# Patient Record
Sex: MALE | Race: BLACK OR AFRICAN AMERICAN | NOT HISPANIC OR LATINO | Employment: UNEMPLOYED | URBAN - NONMETROPOLITAN AREA
[De-identification: names, ages, dates, MRNs, and addresses within clinical notes are randomized per-mention and may not be internally consistent; named-entity substitution may affect disease eponyms.]

---

## 2017-02-06 ENCOUNTER — HOSPITAL ENCOUNTER (EMERGENCY)
Facility: HOSPITAL | Age: 22
Discharge: HOME | End: 2017-02-06
Attending: EMERGENCY MEDICINE | Admitting: EMERGENCY MEDICINE

## 2017-02-06 ENCOUNTER — APPOINTMENT (OUTPATIENT)
Dept: GENERAL RADIOLOGY | Facility: HOSPITAL | Age: 22
End: 2017-02-06

## 2017-02-06 VITALS
WEIGHT: 130 LBS | RESPIRATION RATE: 21 BRPM | BODY MASS INDEX: 20.89 KG/M2 | TEMPERATURE: 97.9 F | OXYGEN SATURATION: 99 % | SYSTOLIC BLOOD PRESSURE: 112 MMHG | HEART RATE: 54 BPM | HEIGHT: 66 IN | DIASTOLIC BLOOD PRESSURE: 71 MMHG

## 2017-02-06 DIAGNOSIS — A64 STD (MALE): Primary | ICD-10-CM

## 2017-02-06 DIAGNOSIS — M25.511 ACUTE PAIN OF RIGHT SHOULDER: ICD-10-CM

## 2017-02-06 DIAGNOSIS — S20.211A CONTUSION OF CHEST WALL, RIGHT, INITIAL ENCOUNTER: ICD-10-CM

## 2017-02-06 PROCEDURE — 71010 HC CHEST PA OR AP: CPT

## 2017-02-06 PROCEDURE — 25010000002 CEFTRIAXONE PER 250 MG: Performed by: PHYSICIAN ASSISTANT

## 2017-02-06 PROCEDURE — 73030 X-RAY EXAM OF SHOULDER: CPT

## 2017-02-06 PROCEDURE — 96372 THER/PROPH/DIAG INJ SC/IM: CPT

## 2017-02-06 PROCEDURE — 99283 EMERGENCY DEPT VISIT LOW MDM: CPT

## 2017-02-06 RX ORDER — CEFTRIAXONE SODIUM 250 MG/1
250 INJECTION, POWDER, FOR SOLUTION INTRAMUSCULAR; INTRAVENOUS ONCE
Status: COMPLETED | OUTPATIENT
Start: 2017-02-06 | End: 2017-02-06

## 2017-02-06 RX ORDER — METRONIDAZOLE 500 MG/1
2000 TABLET ORAL ONCE
Status: COMPLETED | OUTPATIENT
Start: 2017-02-06 | End: 2017-02-06

## 2017-02-06 RX ORDER — ONDANSETRON 4 MG/1
4 TABLET, ORALLY DISINTEGRATING ORAL ONCE
Status: COMPLETED | OUTPATIENT
Start: 2017-02-06 | End: 2017-02-06

## 2017-02-06 RX ORDER — AZITHROMYCIN 250 MG/1
1000 TABLET, FILM COATED ORAL ONCE
Status: COMPLETED | OUTPATIENT
Start: 2017-02-06 | End: 2017-02-06

## 2017-02-06 RX ORDER — LIDOCAINE HYDROCHLORIDE 10 MG/ML
0.9 INJECTION, SOLUTION EPIDURAL; INFILTRATION; INTRACAUDAL; PERINEURAL ONCE
Status: DISCONTINUED | OUTPATIENT
Start: 2017-02-06 | End: 2017-02-06 | Stop reason: HOSPADM

## 2017-02-06 RX ADMIN — METRONIDAZOLE 2000 MG: 500 TABLET ORAL at 14:10

## 2017-02-06 RX ADMIN — ONDANSETRON 4 MG: 4 TABLET, ORALLY DISINTEGRATING ORAL at 14:09

## 2017-02-06 RX ADMIN — CEFTRIAXONE SODIUM 250 MG: 250 INJECTION, POWDER, FOR SOLUTION INTRAMUSCULAR; INTRAVENOUS at 14:11

## 2017-02-06 RX ADMIN — AZITHROMYCIN 1000 MG: 250 TABLET, FILM COATED ORAL at 14:08

## 2017-02-06 NOTE — DISCHARGE INSTRUCTIONS
tylonel for pain.  If right shoulder pain persists make appt with sports medicine.  Treat all sexual partners.  Return as needed for worsening symptoms.

## 2017-02-06 NOTE — ED PROVIDER NOTES
Subjective   HPI Comments: Patient is 21-year-old male who is here today complaining of 2 separate complaints.  Patient states that complaint #12 days ago he was playing basketball when another jason shoulder came down on his right chest wall causing pain.  Patient states he heard a pop patient states pain is intermittent in nature worse with deep breath and movement.  Patient also complains of some pain in the right shoulder.  Patient also complains that he started urinating blood yesterday.  He also states that he has had some purulent drainage from his penis with dysuria times one week.      Review of Systems   Genitourinary: Positive for discharge.   Musculoskeletal:        Right chest wall pain       Past Medical History   Diagnosis Date   • Bradycardia    • Seizure disorder    • Seizures        Allergies   Allergen Reactions   • Penicillins Other (See Comments)     Unknown allergy       Past Surgical History   Procedure Laterality Date   • Tonsillectomy         Family History   Problem Relation Age of Onset   • Diabetes Other        Social History     Social History   • Marital status: Single     Spouse name: N/A   • Number of children: N/A   • Years of education: N/A     Social History Main Topics   • Smoking status: Never Smoker   • Smokeless tobacco: None   • Alcohol use Yes      Comment: occasional   • Drug use: Yes     Special: Marijuana   • Sexual activity: Defer     Other Topics Concern   • None     Social History Narrative   • None           Objective   Physical Exam   Constitutional: He is oriented to person, place, and time. He appears well-developed and well-nourished.   HENT:   Head: Normocephalic and atraumatic.   Eyes: EOM are normal. Pupils are equal, round, and reactive to light.   Cardiovascular: Normal rate and regular rhythm.    Pulmonary/Chest: Effort normal.   Genitourinary: Penis normal.   Genitourinary Comments: No purulent drainage expressed. No pain in the testicles   Musculoskeletal:    Reproducible tenderness of the 5th rib anteriorly, right shoulder tenderness with extension above 90 degrees   Neurological: He is alert and oriented to person, place, and time.   Skin: Skin is warm and dry.   Psychiatric: He has a normal mood and affect.       Procedures         ED Course  ED Course      he was stable throughout the emergency room course.  History is positive for signs of STD.  He also most likely has contusion of the right chest wall.  And right shoulder pain.  We will have the patient follow with primary care.  Tylenol for pain.  We will treat patient for STD.            MDM  Number of Diagnoses or Management Options  Acute pain of right shoulder:   Contusion of chest wall, right, initial encounter:   STD (male):       Final diagnoses:   STD (male)   Contusion of chest wall, right, initial encounter   Acute pain of right shoulder            Yaritza Novak PA-C  02/06/17 1445

## 2017-04-05 ENCOUNTER — HOSPITAL ENCOUNTER (EMERGENCY)
Facility: HOSPITAL | Age: 22
Discharge: HOME | End: 2017-04-05
Attending: EMERGENCY MEDICINE | Admitting: EMERGENCY MEDICINE

## 2017-04-05 VITALS
BODY MASS INDEX: 20.09 KG/M2 | WEIGHT: 125 LBS | HEIGHT: 66 IN | OXYGEN SATURATION: 97 % | SYSTOLIC BLOOD PRESSURE: 118 MMHG | HEART RATE: 59 BPM | TEMPERATURE: 97.9 F | DIASTOLIC BLOOD PRESSURE: 76 MMHG | RESPIRATION RATE: 18 BRPM

## 2017-04-05 DIAGNOSIS — F19.10 SUBSTANCE ABUSE (HCC): Primary | ICD-10-CM

## 2017-04-05 LAB
ANION GAP SERPL CALCULATED.3IONS-SCNC: 12.5 MMOL/L
BUN BLD-MCNC: 9 MG/DL (ref 7–20)
BUN/CREAT SERPL: 10 (ref 6.3–21.9)
CALCIUM SPEC-SCNC: 8.3 MG/DL (ref 8.4–10.2)
CHLORIDE SERPL-SCNC: 106 MMOL/L (ref 98–107)
CK SERPL-CCNC: 294 U/L (ref 30–170)
CO2 SERPL-SCNC: 27 MMOL/L (ref 26–30)
CREAT BLD-MCNC: 0.9 MG/DL (ref 0.6–1.3)
GFR SERPL CREATININE-BSD FRML MDRD: 130 ML/MIN/1.73
GLUCOSE BLD-MCNC: 85 MG/DL (ref 74–98)
POTASSIUM BLD-SCNC: 3.5 MMOL/L (ref 3.5–5.1)
SODIUM BLD-SCNC: 142 MMOL/L (ref 137–145)

## 2017-04-05 PROCEDURE — 93005 ELECTROCARDIOGRAM TRACING: CPT | Performed by: EMERGENCY MEDICINE

## 2017-04-05 PROCEDURE — 99283 EMERGENCY DEPT VISIT LOW MDM: CPT

## 2017-04-05 PROCEDURE — 82550 ASSAY OF CK (CPK): CPT | Performed by: EMERGENCY MEDICINE

## 2017-04-05 PROCEDURE — 80048 BASIC METABOLIC PNL TOTAL CA: CPT | Performed by: EMERGENCY MEDICINE

## 2017-04-05 NOTE — ED PROVIDER NOTES
"Subjective   HPI Comments: 21-year-old male presenting for medical clearance.  He is brought in by [employer] after he was picked up for warrants.  During intake he complained that he was lightheaded about to pass out, became sweaty so he was sent here for evaluation.  He states that last night he drank alcohol and did GHB while partying.  He denies any other drug use.  He has no specific complaints at this time other than \"feeling like I'm real dehydrated\".      Review of Systems   Constitutional: Negative for chills and fever.   HENT: Negative for congestion, rhinorrhea and sore throat.    Eyes: Negative for pain.   Respiratory: Negative for cough and shortness of breath.    Cardiovascular: Negative for chest pain, palpitations and leg swelling.   Gastrointestinal: Negative for abdominal pain, diarrhea, nausea and vomiting.   Genitourinary: Negative for dysuria.   Musculoskeletal: Negative for arthralgias.   Skin: Negative for rash.   Neurological: Positive for light-headedness. Negative for weakness and numbness.   Psychiatric/Behavioral: Negative for behavioral problems.       Past Medical History:   Diagnosis Date   • Bradycardia    • Seizure disorder    • Seizures        Allergies   Allergen Reactions   • Penicillins Other (See Comments)     Unknown allergy       Past Surgical History:   Procedure Laterality Date   • TONSILLECTOMY         Family History   Problem Relation Age of Onset   • Diabetes Other        Social History     Social History   • Marital status: Single     Spouse name: N/A   • Number of children: N/A   • Years of education: N/A     Social History Main Topics   • Smoking status: Never Smoker   • Smokeless tobacco: None   • Alcohol use Yes      Comment: occasional   • Drug use: Yes     Special: Marijuana      Comment: GHB   • Sexual activity: Defer     Other Topics Concern   • None     Social History Narrative   • None           Objective   Physical Exam   Constitutional: He is oriented to person, " place, and time. He appears well-developed and well-nourished. No distress.   HENT:   Head: Normocephalic and atraumatic.   Right Ear: External ear normal.   Left Ear: External ear normal.   Nose: Nose normal.   Mouth/Throat: Oropharynx is clear and moist.   Eyes: Conjunctivae and EOM are normal. Pupils are equal, round, and reactive to light.   Neck: Normal range of motion. Neck supple.   Cardiovascular: Normal rate, regular rhythm, normal heart sounds and intact distal pulses.    Pulmonary/Chest: Effort normal and breath sounds normal. No respiratory distress.   Abdominal: Soft. Bowel sounds are normal. He exhibits no distension. There is no tenderness. There is no rebound and no guarding.   Musculoskeletal: Normal range of motion. He exhibits no edema, tenderness or deformity.   Neurological: He is alert and oriented to person, place, and time.   Skin: Skin is warm and dry. No rash noted.   Psychiatric: He has a normal mood and affect. His behavior is normal.   Nursing note and vitals reviewed.      Procedures         ED Course  ED Course                  MDM  Number of Diagnoses or Management Options  Substance abuse:   Diagnosis management comments: 21-year-old male with substance abuse.  Well-developed, well-nourished man in no distress with normal vital signs and nonfocal exam.  We will check a basic metabolic panel to look at his kidney function and an EKG given his presyncope.  We'll hydrate.  Disposition is likely discharge in the custody of law enforcement.  -labs  -ekg  -ivf    Ddx: substance abuse, dehydration, rhabdo    Ekg: sinus rhythm, normal rate, normal axis/intervals, no st changes    CK mildly elevated, Cr normal. Feeling better after fluids. Will discharge into custody of police.      Final diagnoses:   Substance abuse            Wilfredo Maciel MD  04/05/17 [id number]

## 2018-06-14 ENCOUNTER — OFFICE VISIT (OUTPATIENT)
Dept: INTERNAL MEDICINE | Facility: CLINIC | Age: 23
End: 2018-06-14

## 2018-06-14 VITALS
TEMPERATURE: 97.9 F | BODY MASS INDEX: 22.68 KG/M2 | OXYGEN SATURATION: 98 % | WEIGHT: 141.13 LBS | RESPIRATION RATE: 12 BRPM | HEART RATE: 54 BPM | HEIGHT: 66 IN | DIASTOLIC BLOOD PRESSURE: 68 MMHG | SYSTOLIC BLOOD PRESSURE: 110 MMHG

## 2018-06-14 DIAGNOSIS — Z72.0 TOBACCO ABUSE: ICD-10-CM

## 2018-06-14 DIAGNOSIS — R53.83 FATIGUE, UNSPECIFIED TYPE: ICD-10-CM

## 2018-06-14 DIAGNOSIS — Z76.89 ENCOUNTER TO ESTABLISH CARE: ICD-10-CM

## 2018-06-14 DIAGNOSIS — R00.2 HEART PALPITATIONS: ICD-10-CM

## 2018-06-14 DIAGNOSIS — R00.1 BRADYCARDIA: Primary | ICD-10-CM

## 2018-06-14 DIAGNOSIS — J30.2 ACUTE SEASONAL ALLERGIC RHINITIS, UNSPECIFIED TRIGGER: ICD-10-CM

## 2018-06-14 DIAGNOSIS — R56.9 SEIZURE (HCC): ICD-10-CM

## 2018-06-14 PROCEDURE — 99406 BEHAV CHNG SMOKING 3-10 MIN: CPT | Performed by: NURSE PRACTITIONER

## 2018-06-14 PROCEDURE — 99214 OFFICE O/P EST MOD 30 MIN: CPT | Performed by: NURSE PRACTITIONER

## 2018-06-14 RX ORDER — LEVETIRACETAM 500 MG/1
500 TABLET ORAL EVERY 12 HOURS SCHEDULED
Qty: 60 TABLET | Refills: 1 | Status: SHIPPED | OUTPATIENT
Start: 2018-06-14 | End: 2018-08-31 | Stop reason: SDUPTHER

## 2018-06-14 RX ORDER — MONTELUKAST SODIUM 10 MG/1
10 TABLET ORAL NIGHTLY
Qty: 30 TABLET | Refills: 1 | Status: SHIPPED | OUTPATIENT
Start: 2018-06-14 | End: 2018-08-06 | Stop reason: SDUPTHER

## 2018-06-14 RX ORDER — FEXOFENADINE HCL 180 MG/1
180 TABLET ORAL AS NEEDED
COMMUNITY
End: 2019-05-22

## 2018-06-14 RX ORDER — LEVETIRACETAM 500 MG/1
500 TABLET ORAL EVERY 12 HOURS SCHEDULED
Qty: 60 TABLET | Refills: 1 | Status: SHIPPED | OUTPATIENT
Start: 2018-06-14 | End: 2018-06-14 | Stop reason: SDUPTHER

## 2018-06-14 RX ORDER — LEVETIRACETAM 500 MG/1
500 TABLET ORAL 2 TIMES DAILY
COMMUNITY
End: 2018-06-14 | Stop reason: SDUPTHER

## 2018-06-14 NOTE — PROGRESS NOTES
Chief Complaint / Reason:      Chief Complaint   Patient presents with   • Establish Care     Keppra refill. Allegies. Needs referrals for Dr. Ayala, Gastro. (not Dr. Serrano) and neurology.        Subjective     HPI  Patient presents today to establish care with mother present and medication refills.  He has been incarcerated for the past year. He has history of focal seizures since the age of 12 or 13 and bradycardia and palpitations.  His seizures have been unmanaged and he does not know when his last one was and just recently began taking his Keppra again.  He reports he does not know when they occur because he feels lethargic, confused and fatigued consistently and decreased concentration. Has seen Dr. Maddox in the past. Patient has history of drug abuse with marijuana use but now works and is drug tested weekly, denies history of using needles, or snorting pills. Patient and mother are requesting referral for neurology and cardiology. Older brother has history of heart issues with pacemaker which began with symptoms of bradycardia as well. Patient has been seen by Dr. Ayala in the past. He denies current chest pain, shortness of breath, dizziness or blurred vision. .Has history of heartburn with epigastric pain, anxiety and depression. Denies SI/HI.   History taken from: patient    PMH/FH/Social History were reviewed and updated appropriately in the electronic medical record.     Review of Systems:   Review of Systems   Constitutional: Positive for fatigue. Negative for appetite change, chills, fever and unexpected weight change.   HENT: Positive for ear pain, postnasal drip, sore throat and voice change. Negative for congestion, hearing loss, nosebleeds, rhinorrhea, tinnitus and trouble swallowing.    Eyes: Positive for itching. Negative for photophobia, discharge and visual disturbance.   Respiratory: Positive for cough. Negative for chest tightness, shortness of breath and wheezing.     Cardiovascular: Positive for chest pain and palpitations. Negative for leg swelling.   Gastrointestinal: Positive for abdominal pain and nausea. Negative for abdominal distention, blood in stool, constipation, diarrhea and vomiting.   Endocrine: Negative for cold intolerance, heat intolerance, polydipsia, polyphagia and polyuria.   Genitourinary: Negative for difficulty urinating, dysuria, flank pain, frequency, genital sores, hematuria and urgency.   Musculoskeletal: Negative for arthralgias, back pain, joint swelling, myalgias, neck pain and neck stiffness.   Skin: Negative for color change, pallor, rash and wound.   Allergic/Immunologic: Negative for environmental allergies, food allergies and immunocompromised state.   Neurological: Positive for dizziness and seizures. Negative for tremors, weakness, numbness and headaches.   Hematological: Negative for adenopathy. Does not bruise/bleed easily.   Psychiatric/Behavioral: Positive for confusion, decreased concentration and dysphoric mood. Negative for agitation, behavioral problems, hallucinations, self-injury and suicidal ideas. The patient is nervous/anxious.      All other systems were reviewed and are negative.  Exceptions are noted in the subjective or above.      Objective     Vital Signs  Vitals:    06/14/18 1020   BP: 110/68   Pulse: 54   Resp: 12   Temp: 97.9 °F (36.6 °C)   SpO2: 98%       Body mass index is 22.78 kg/m².    Physical Exam   Constitutional: He is oriented to person, place, and time. He appears well-developed and well-nourished. No distress.   HENT:   Head: Normocephalic and atraumatic.   Right Ear: Tympanic membrane is bulging.   Left Ear: Tympanic membrane is bulging.   Nose: Nose normal.   Mouth/Throat: Oropharynx is clear and moist.   Eyes: EOM are normal. Pupils are equal, round, and reactive to light.   Neck: Normal range of motion. Neck supple. No thyromegaly present.   Cardiovascular: Regular rhythm, normal heart sounds and intact  distal pulses.  Bradycardia present.    Pulmonary/Chest: Effort normal and breath sounds normal. He exhibits no tenderness.   Abdominal: Soft. Bowel sounds are normal. He exhibits no distension. There is no tenderness.   Musculoskeletal: Normal range of motion. He exhibits no tenderness.   Lymphadenopathy:     He has no cervical adenopathy.   Neurological: He is alert and oriented to person, place, and time. He has normal strength. No cranial nerve deficit or sensory deficit. Coordination normal. GCS eye subscore is 4. GCS verbal subscore is 5. GCS motor subscore is 6.   Skin: Skin is warm and dry.   Psychiatric: He has a normal mood and affect. His behavior is normal. Judgment and thought content normal. His speech is delayed. Cognition and memory are impaired. He expresses no homicidal and no suicidal ideation.   Patient had difficulty articulating thoughts   Nursing note and vitals reviewed.       Results Review:    I reviewed the patient's previous clinical results.       Medication Review:     Current Outpatient Prescriptions:   •  fexofenadine (ALLEGRA) 180 MG tablet, Take 180 mg by mouth As Needed., Disp: , Rfl:   •  levETIRAcetam (KEPPRA) 500 MG tablet, Take 1 tablet by mouth Every 12 (Twelve) Hours., Disp: 60 tablet, Rfl: 1  •  fluticasone (VERAMYST) 27.5 MCG/SPRAY nasal spray, 2 sprays into each nostril Daily., Disp: 15.8 mL, Rfl: 2  •  montelukast (SINGULAIR) 10 MG tablet, Take 1 tablet by mouth Every Night., Disp: 30 tablet, Rfl: 1    Assessment/Plan   Martin was seen today for establish care.    Diagnoses and all orders for this visit:    Bradycardia  -     Ambulatory Referral to Cardiology    Seizure  -     Ambulatory Referral to Neurology  -     levETIRAcetam (KEPPRA) 500 MG tablet; Take 1 tablet by mouth Every 12 (Twelve) Hours.    Tobacco abuse  I advised Martin of the risks of continuing to use tobacco, and I provided him with tobacco cessation educational materials in the After Visit Summary.      During this visit, I spent 3 minutes counseling the patient regarding tobacco cessation.    Heart palpitations  -     Ambulatory Referral to Cardiology    Encounter to establish care  -     TSH  -     T4, free  -     Vitamin D 25 hydroxy  -     Comprehensive metabolic panel  -     CBC w AUTO Differential  -     Testosterone    Acute seasonal allergic rhinitis, unspecified trigger  -     montelukast (SINGULAIR) 10 MG tablet; Take 1 tablet by mouth Every Night.  -     fluticasone (VERAMYST) 27.5 MCG/SPRAY nasal spray; 2 sprays into each nostril Daily.    Fatigue, unspecified type  Recommend exercise, adequate rest and nutrition.   Other orders  -     Discontinue: levETIRAcetam (KEPPRA) 500 MG tablet; Take 1 tablet by mouth Every 12 (Twelve) Hours.        Return in about 4 weeks (around 7/12/2018), or if symptoms worsen or fail to improve.    Patsy Seth, APRN  06/14/2018

## 2018-06-15 LAB
25(OH)D3+25(OH)D2 SERPL-MCNC: 50.2 NG/ML
ALBUMIN SERPL-MCNC: 4.3 G/DL (ref 3.5–5)
ALBUMIN/GLOB SERPL: 1.5 G/DL (ref 1–2)
ALP SERPL-CCNC: 62 U/L (ref 38–126)
ALT SERPL-CCNC: 49 U/L (ref 13–69)
AST SERPL-CCNC: 44 U/L (ref 15–46)
BASOPHILS # BLD AUTO: 0.02 10*3/MM3 (ref 0–0.2)
BASOPHILS NFR BLD AUTO: 0.4 % (ref 0–2.5)
BILIRUB SERPL-MCNC: 0.5 MG/DL (ref 0.2–1.3)
BUN SERPL-MCNC: 14 MG/DL (ref 7–20)
BUN/CREAT SERPL: 17.5 (ref 6.3–21.9)
CALCIUM SERPL-MCNC: 9.1 MG/DL (ref 8.4–10.2)
CHLORIDE SERPL-SCNC: 103 MMOL/L (ref 98–107)
CO2 SERPL-SCNC: 31 MMOL/L (ref 26–30)
CREAT SERPL-MCNC: 0.8 MG/DL (ref 0.6–1.3)
EOSINOPHIL # BLD AUTO: 0.08 10*3/MM3 (ref 0–0.7)
EOSINOPHIL NFR BLD AUTO: 1.6 % (ref 0–7)
ERYTHROCYTE [DISTWIDTH] IN BLOOD BY AUTOMATED COUNT: 12.6 % (ref 11.5–14.5)
GFR SERPLBLD CREATININE-BSD FMLA CKD-EPI: 121 ML/MIN/1.73
GFR SERPLBLD CREATININE-BSD FMLA CKD-EPI: 147 ML/MIN/1.73
GLOBULIN SER CALC-MCNC: 2.9 GM/DL
GLUCOSE SERPL-MCNC: 74 MG/DL (ref 74–98)
HCT VFR BLD AUTO: 40.4 % (ref 42–52)
HGB BLD-MCNC: 13.3 G/DL (ref 14–18)
IMM GRANULOCYTES # BLD: 0.02 10*3/MM3 (ref 0–0.06)
IMM GRANULOCYTES NFR BLD: 0.4 % (ref 0–0.6)
LYMPHOCYTES # BLD AUTO: 1.51 10*3/MM3 (ref 0.6–3.4)
LYMPHOCYTES NFR BLD AUTO: 30 % (ref 10–50)
MCH RBC QN AUTO: 29.3 PG (ref 27–31)
MCHC RBC AUTO-ENTMCNC: 32.9 G/DL (ref 30–37)
MCV RBC AUTO: 89 FL (ref 80–94)
MONOCYTES # BLD AUTO: 0.8 10*3/MM3 (ref 0–0.9)
MONOCYTES NFR BLD AUTO: 15.9 % (ref 0–12)
NEUTROPHILS # BLD AUTO: 2.6 10*3/MM3 (ref 2–6.9)
NEUTROPHILS NFR BLD AUTO: 51.7 % (ref 37–80)
NRBC BLD AUTO-RTO: 0 /100 WBC (ref 0–0)
PLATELET # BLD AUTO: 163 10*3/MM3 (ref 130–400)
POTASSIUM SERPL-SCNC: 4.1 MMOL/L (ref 3.5–5.1)
PROT SERPL-MCNC: 7.2 G/DL (ref 6.3–8.2)
RBC # BLD AUTO: 4.54 10*6/MM3 (ref 4.7–6.1)
SODIUM SERPL-SCNC: 141 MMOL/L (ref 137–145)
T4 FREE SERPL-MCNC: 1.06 NG/DL (ref 0.78–2.19)
TESTOST SERPL-MCNC: 617 NG/DL (ref 264–916)
TSH SERPL DL<=0.005 MIU/L-ACNC: 1.32 MIU/ML (ref 0.47–4.68)
WBC # BLD AUTO: 5.03 10*3/MM3 (ref 4.8–10.8)

## 2018-06-27 ENCOUNTER — OFFICE VISIT (OUTPATIENT)
Dept: INTERNAL MEDICINE | Facility: CLINIC | Age: 23
End: 2018-06-27

## 2018-06-27 VITALS
SYSTOLIC BLOOD PRESSURE: 104 MMHG | BODY MASS INDEX: 22.66 KG/M2 | HEIGHT: 66 IN | WEIGHT: 141 LBS | DIASTOLIC BLOOD PRESSURE: 62 MMHG | OXYGEN SATURATION: 97 % | HEART RATE: 62 BPM | TEMPERATURE: 97.6 F | RESPIRATION RATE: 12 BRPM

## 2018-06-27 DIAGNOSIS — J30.2 ACUTE SEASONAL ALLERGIC RHINITIS, UNSPECIFIED TRIGGER: ICD-10-CM

## 2018-06-27 DIAGNOSIS — B08.1 MOLLUSCUM CONTAGIOSUM: Primary | ICD-10-CM

## 2018-06-27 PROBLEM — G40.909 SEIZURE DISORDER (HCC): Status: ACTIVE | Noted: 2018-06-27

## 2018-06-27 PROCEDURE — 99213 OFFICE O/P EST LOW 20 MIN: CPT | Performed by: FAMILY MEDICINE

## 2018-06-27 NOTE — PROGRESS NOTES
"Subjective    Martin Marie Jr. is a 23 y.o. male here for:  Chief Complaint   Patient presents with   • Bumps on elbows, hands and knees     Patient denies a history of immunosuppression. On Keppra for seizures, had been out but resumed medicine about a week ago. Developed the itchy bumps on elbows, hands, etc a few days ago. Has had similar bumps int he past when stressed. Nobody else has lesions.      Rash   This is a recurrent problem. The current episode started in the past 7 days. The affected locations include the left hand, left wrist, left arm, left ankle, right arm, right hand, right wrist, right elbow and left elbow. The rash is characterized by itchiness (round bumps). He was exposed to nothing. Pertinent negatives include no fever. Past treatments include nothing.          The following portions of the patient's history were reviewed and updated as appropriate: allergies, current medications, past family history, past medical history, past social history, past surgical history and problem list.    Review of Systems   Constitutional: Negative for fever.   Skin: Positive for rash.       Vitals:    06/27/18 1618   BP: 104/62   Pulse: 62   Resp: 12   Temp: 97.6 °F (36.4 °C)   SpO2: 97%   Weight: 64 kg (141 lb)   Height: 167.6 cm (66\")         Objective   Physical Exam   Constitutional: He is oriented to person, place, and time. Vital signs are normal. He appears well-developed and well-nourished. He is active. He does not appear ill. No distress.   HENT:   Head: Normocephalic and atraumatic.   Eyes: EOM are normal.   Neck: Neck supple.   Pulmonary/Chest: Effort normal.   Neurological: He is alert and oriented to person, place, and time. No cranial nerve deficit.   Skin: Skin is warm. Rash noted. Rash is papular (see photo; worst on elbows but also noted to be on hands sporadically as well as forearms, writs, ankles). He is not diaphoretic.   Psychiatric: He has a normal mood and affect. His " behavior is normal.   Nursing note and vitals reviewed.         Assessment/Plan     Problem List Items Addressed This Visit     None      Visit Diagnoses     Molluscum contagiosum    -  Primary    Relevant Orders    Ambulatory Referral to Dermatology    Acute seasonal allergic rhinitis, unspecified trigger        Relevant Medications    fluticasone (VERAMYST) 27.5 MCG/SPRAY nasal spray          · Top of DDx is molluscum. Patient denies immunocompromised state, says he's had HIV testing in the past. Lesions should resolve on their own but referral in place in case they do not. Discussed possible dermatitis herpetiformis but not consistent and no abdominal issues.    No Follow-up on file.    Wilma To MD    Please note that portions of this note may have been completed with a voice recognition program. Efforts were made to edit dictation, but occasionally words are mistranscribed.

## 2018-07-11 ENCOUNTER — OFFICE VISIT (OUTPATIENT)
Dept: CARDIOLOGY | Facility: CLINIC | Age: 23
End: 2018-07-11

## 2018-07-11 VITALS
HEIGHT: 66 IN | WEIGHT: 140.8 LBS | BODY MASS INDEX: 22.63 KG/M2 | HEART RATE: 63 BPM | SYSTOLIC BLOOD PRESSURE: 123 MMHG | DIASTOLIC BLOOD PRESSURE: 80 MMHG

## 2018-07-11 DIAGNOSIS — R00.2 PALPITATIONS: Primary | ICD-10-CM

## 2018-07-11 DIAGNOSIS — F17.200 CURRENT SMOKER: ICD-10-CM

## 2018-07-11 DIAGNOSIS — R07.81 PLEURITIC CHEST PAIN: ICD-10-CM

## 2018-07-11 PROCEDURE — 99213 OFFICE O/P EST LOW 20 MIN: CPT | Performed by: INTERNAL MEDICINE

## 2018-07-11 NOTE — ASSESSMENT & PLAN NOTE
Patient's sharp fleeting chest pain symptoms are consistent with pleuritic chest pain. He is at extremely low pretest probability for obstructive coronary artery disease and therefore stress testing is not recommended.

## 2018-07-11 NOTE — PROGRESS NOTES
Encounter Date:07/11/2018    Patient ID: Martin Marie Jr. is a 23 y.o. male who resides in [place], [place].    CC/Reason for visit:  Shortness of Breath (overdue follow up) and Palpitations            Martin Marie Jr. returns to my office today in follow up. Both he and myself are unclear as to why he is in the office. I saw him in 2014. At that time he was referred to me for asymptomatic bradycardia. He underwent an echocardiogram which was normal. The patient states that he occasionally will get sharp chest pains lasting a second or 2. He also mentions that after playing basketball he will feel a thump in his chest which also lasted just a second. 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 He is had no changes in his exercise capacity, no syncope, and no sustained palpitations symptoms.    Review of Systems   Constitution: Negative for weakness and malaise/fatigue.   HENT: Positive for tinnitus.    Eyes: Negative for vision loss in left eye and vision loss in right eye.   Cardiovascular: Positive for palpitations. Negative for chest pain, dyspnea on exertion, near-syncope, orthopnea, paroxysmal nocturnal dyspnea and syncope.   Respiratory: Positive for cough.         Sleep apnea   Musculoskeletal: Negative for myalgias.   Gastrointestinal: Positive for heartburn and hematochezia.   Neurological: Positive for excessive daytime sleepiness, dizziness, headaches, loss of balance and seizures. Negative for brief paralysis, focal weakness, numbness and paresthesias.   Psychiatric/Behavioral: Positive for memory loss. The patient is nervous/anxious.    All other systems reviewed and are negative.      The patient's past medical, social, family history and ROS reviewed in the patient's electronic medical record.    Allergies  Penicillins    Outpatient Prescriptions Marked as Taking for the 7/11/18 encounter (Office Visit) with Vladislav Ayala IV, MD   Medication Sig  "Dispense Refill   • fexofenadine (ALLEGRA) 180 MG tablet Take 180 mg by mouth As Needed.     • levETIRAcetam (KEPPRA) 500 MG tablet Take 1 tablet by mouth Every 12 (Twelve) Hours. 60 tablet 1   • montelukast (SINGULAIR) 10 MG tablet Take 1 tablet by mouth Every Night. 30 tablet 1         Blood pressure 123/80, pulse 63, height 167.6 cm (66\"), weight 63.9 kg (140 lb 12.8 oz).  Body mass index is 22.73 kg/m².  There were no vitals filed for this visit.    Physical Exam   Constitutional: He is oriented to person, place, and time. He appears well-developed and well-nourished.   HENT:   Head: Normocephalic and atraumatic.   Eyes: Pupils are equal, round, and reactive to light. No scleral icterus.   Neck: No JVD present. Carotid bruit is not present. No thyromegaly present.   Cardiovascular: Normal rate, regular rhythm, S1 normal and S2 normal.  Exam reveals no gallop.    No murmur heard.  Pulmonary/Chest: Effort normal and breath sounds normal.   Abdominal: Soft. There is no hepatosplenomegaly. There is no tenderness.   Neurological: He is alert and oriented to person, place, and time.   Skin: Skin is warm and dry. No cyanosis. Nails show no clubbing.   Psychiatric: He has a normal mood and affect. His behavior is normal.       Data Review:     Procedures    Lab Results   Component Value Date    AST 44 06/14/2018    ALT 49 06/14/2018     Lab Results   Component Value Date    GLUCOSE 85 04/05/2017    BUN 14 06/14/2018    CREATININE 0.80 06/14/2018    EGFRIFNONA 121 06/14/2018    EGFRIFAFRI 147 06/14/2018    BCR 17.5 06/14/2018    K 4.1 06/14/2018    CO2 31.0 (H) 06/14/2018    CALCIUM 9.1 06/14/2018    PROTENTOTREF 7.2 06/14/2018    ALBUMIN 4.30 06/14/2018    LABIL2 1.5 06/14/2018    AST 44 06/14/2018    ALT 49 06/14/2018     Lab Results   Component Value Date    TSH 1.320 06/14/2018     Lab Results   Component Value Date    WBC 10.9 (H) 05/21/2015    HGB 13.3 (L) 06/14/2018    HCT 40.4 (L) 06/14/2018    MCV 89.0 " 06/14/2018     06/14/2018          Problem List Items Addressed This Visit        Cardiovascular and Mediastinum    Palpitations - Primary    Overview     Echo (12/9/2014): LVEF 60%. No significant valvular abnormality  Holter (12/2014): Average heart rate 60 BPM.         Current Assessment & Plan     · Patient's occasional palpitations sound benign and are likely related to occasional premature ventricular contractions. The patient does not desire any additional testing for this as they are infrequent and not particularly symptomatic            Respiratory    Pleuritic chest pain    Overview     · Normal EKG, 7/11/18         Current Assessment & Plan     Patient's sharp fleeting chest pain symptoms are consistent with pleuritic chest pain. He is at extremely low pretest probability for obstructive coronary artery disease and therefore stress testing is not recommended.            Other    Current smoker    Current Assessment & Plan     · Smoking cessation recommended.                    · Smoking cessation advised  · No diagnostic or therapeutic intervention needed at this time  · No cardiology follow-up needed    Vladislav Ayala IV, MD  7/11/2018     Scribed for Vladislav Ayala IV, MD by Robin Blanco. 7/11/2018  12:09 PM

## 2018-07-11 NOTE — ASSESSMENT & PLAN NOTE
· Patient's occasional palpitations sound benign and are likely related to occasional premature ventricular contractions. The patient does not desire any additional testing for this as they are infrequent and not particularly symptomatic

## 2018-08-02 ENCOUNTER — TELEPHONE (OUTPATIENT)
Dept: INTERNAL MEDICINE | Facility: CLINIC | Age: 23
End: 2018-08-02

## 2018-08-02 NOTE — TELEPHONE ENCOUNTER
Patient left voice mail wanting to schedule appt today. Tried calling patient back and left voice mail.

## 2018-08-03 ENCOUNTER — TELEPHONE (OUTPATIENT)
Dept: INTERNAL MEDICINE | Facility: CLINIC | Age: 23
End: 2018-08-03

## 2018-08-03 NOTE — TELEPHONE ENCOUNTER
Patient left voice mail wanting to make appointment. Tried calling patient back and left voice mail for patient to call back.

## 2018-08-06 DIAGNOSIS — J30.2 ACUTE SEASONAL ALLERGIC RHINITIS, UNSPECIFIED TRIGGER: ICD-10-CM

## 2018-08-06 RX ORDER — MONTELUKAST SODIUM 10 MG/1
TABLET ORAL
Qty: 30 TABLET | Refills: 1 | Status: SHIPPED | OUTPATIENT
Start: 2018-08-06 | End: 2018-08-31

## 2018-08-31 ENCOUNTER — OFFICE VISIT (OUTPATIENT)
Dept: INTERNAL MEDICINE | Facility: CLINIC | Age: 23
End: 2018-08-31

## 2018-08-31 VITALS
TEMPERATURE: 98.3 F | SYSTOLIC BLOOD PRESSURE: 98 MMHG | OXYGEN SATURATION: 97 % | HEART RATE: 70 BPM | HEIGHT: 66 IN | WEIGHT: 138.5 LBS | BODY MASS INDEX: 22.26 KG/M2 | RESPIRATION RATE: 14 BRPM | DIASTOLIC BLOOD PRESSURE: 62 MMHG

## 2018-08-31 DIAGNOSIS — R56.9 SEIZURE (HCC): ICD-10-CM

## 2018-08-31 DIAGNOSIS — Z20.2 CHLAMYDIA CONTACT: Primary | ICD-10-CM

## 2018-08-31 PROCEDURE — 99214 OFFICE O/P EST MOD 30 MIN: CPT | Performed by: NURSE PRACTITIONER

## 2018-08-31 RX ORDER — LEVETIRACETAM 500 MG/1
500 TABLET ORAL EVERY 12 HOURS SCHEDULED
Qty: 60 TABLET | Refills: 1 | Status: SHIPPED | OUTPATIENT
Start: 2018-08-31 | End: 2018-12-30 | Stop reason: SDUPTHER

## 2018-08-31 RX ORDER — AZITHROMYCIN 500 MG/1
1000 TABLET, FILM COATED ORAL ONCE
Qty: 2 TABLET | Refills: 0 | Status: SHIPPED | OUTPATIENT
Start: 2018-08-31 | End: 2018-08-31

## 2018-12-15 ENCOUNTER — HOSPITAL ENCOUNTER (EMERGENCY)
Facility: HOSPITAL | Age: 23
Discharge: HOME | End: 2018-12-15
Attending: STUDENT IN AN ORGANIZED HEALTH CARE EDUCATION/TRAINING PROGRAM | Admitting: STUDENT IN AN ORGANIZED HEALTH CARE EDUCATION/TRAINING PROGRAM

## 2018-12-15 VITALS
HEIGHT: 65 IN | TEMPERATURE: 98.3 F | DIASTOLIC BLOOD PRESSURE: 70 MMHG | OXYGEN SATURATION: 95 % | RESPIRATION RATE: 17 BRPM | HEART RATE: 76 BPM | WEIGHT: 141.6 LBS | BODY MASS INDEX: 23.59 KG/M2 | SYSTOLIC BLOOD PRESSURE: 121 MMHG

## 2018-12-15 DIAGNOSIS — Z20.2 EXPOSURE TO STD: Primary | ICD-10-CM

## 2018-12-15 LAB
BILIRUB UR QL STRIP: NEGATIVE
CLARITY UR: ABNORMAL
COLOR UR: YELLOW
GLUCOSE UR STRIP-MCNC: NEGATIVE MG/DL
HGB UR QL STRIP.AUTO: NEGATIVE
KETONES UR QL STRIP: NEGATIVE
LEUKOCYTE ESTERASE UR QL STRIP.AUTO: NEGATIVE
NITRITE UR QL STRIP: NEGATIVE
PH UR STRIP.AUTO: 8.5 [PH] (ref 5–8)
PROT UR QL STRIP: ABNORMAL
SP GR UR STRIP: 1.02 (ref 1–1.03)
UROBILINOGEN UR QL STRIP: ABNORMAL

## 2018-12-15 PROCEDURE — 87591 N.GONORRHOEAE DNA AMP PROB: CPT | Performed by: STUDENT IN AN ORGANIZED HEALTH CARE EDUCATION/TRAINING PROGRAM

## 2018-12-15 PROCEDURE — 99283 EMERGENCY DEPT VISIT LOW MDM: CPT

## 2018-12-15 PROCEDURE — 87491 CHLMYD TRACH DNA AMP PROBE: CPT | Performed by: STUDENT IN AN ORGANIZED HEALTH CARE EDUCATION/TRAINING PROGRAM

## 2018-12-15 PROCEDURE — 81003 URINALYSIS AUTO W/O SCOPE: CPT

## 2018-12-15 RX ORDER — AZITHROMYCIN 250 MG/1
1000 TABLET, FILM COATED ORAL ONCE
Status: COMPLETED | OUTPATIENT
Start: 2018-12-15 | End: 2018-12-15

## 2018-12-15 RX ORDER — DOXYCYCLINE 100 MG/1
100 CAPSULE ORAL 2 TIMES DAILY
Qty: 14 CAPSULE | Refills: 0 | Status: SHIPPED | OUTPATIENT
Start: 2018-12-15 | End: 2019-05-22

## 2018-12-15 RX ADMIN — AZITHROMYCIN 1000 MG: 250 TABLET, FILM COATED ORAL at 16:55

## 2018-12-15 NOTE — ED PROVIDER NOTES
Subjective   The patient is here with complaint of possible STD patient states his girlfriend was recently diagnosed with Chlamydia he has no symptoms currently no abdominal pain no penile discharge the patient interested in getting treated no other symptoms        History provided by:  Patient      Review of Systems   Constitutional: Negative.    HENT: Negative.    Eyes: Negative.    Respiratory: Negative.    Cardiovascular: Negative.    Gastrointestinal: Negative.    Genitourinary: Negative.  Negative for discharge, penile pain and testicular pain.   Musculoskeletal: Negative.    Skin: Negative.    Neurological: Negative.    Psychiatric/Behavioral: The patient is nervous/anxious.    All other systems reviewed and are negative.      Past Medical History:   Diagnosis Date   • Acid reflux    • Bradycardia    • Depression    • Kidney stones    • Seizure disorder (CMS/HCC)    • Seizures (CMS/HCC)        Allergies   Allergen Reactions   • Penicillins Other (See Comments)     Unknown allergy       Past Surgical History:   Procedure Laterality Date   • FRACTURE SURGERY      arm   • HERNIA REPAIR     • TONSILLECTOMY         Family History   Problem Relation Age of Onset   • Diabetes Other    • Migraines Mother    • Migraines Father    • Arthritis Maternal Grandmother    • Mental illness Other        Social History     Socioeconomic History   • Marital status: Single     Spouse name: Not on file   • Number of children: Not on file   • Years of education: Not on file   • Highest education level: Not on file   Tobacco Use   • Smoking status: Current Every Day Smoker     Packs/day: 0.10     Years: 1.00     Pack years: 0.10   • Smokeless tobacco: Never Used   Substance and Sexual Activity   • Alcohol use: Yes     Comment: occasional   • Drug use: Yes     Types: Marijuana     Comment: GHB   • Sexual activity: Defer           Objective   Physical Exam   Constitutional: He is oriented to person, place, and time. He appears  well-developed and well-nourished. No distress.   Afebrile vital signs stable no acute distress   HENT:   Head: Normocephalic and atraumatic.   Mouth/Throat: No oropharyngeal exudate.   Eyes: Conjunctivae and EOM are normal. Pupils are equal, round, and reactive to light.   Neck: Normal range of motion. Neck supple.   Cardiovascular: Normal rate, regular rhythm and intact distal pulses.   Pulmonary/Chest: Effort normal and breath sounds normal.   Abdominal: Soft. There is no tenderness.   Genitourinary: Penis normal.   Genitourinary Comments: No penile discharge no testicle tenderness   Musculoskeletal: Normal range of motion. He exhibits no edema.   Lymphadenopathy:     He has no cervical adenopathy.   Neurological: He is alert and oriented to person, place, and time. No cranial nerve deficit or sensory deficit. He exhibits normal muscle tone. Coordination normal.   Skin: Skin is warm and dry. Capillary refill takes less than 2 seconds. He is not diaphoretic.   Psychiatric: He has a normal mood and affect. His behavior is normal. Judgment and thought content normal.   Nursing note and vitals reviewed.      Procedures           ED Course                  MDM  Number of Diagnoses or Management Options     Amount and/or Complexity of Data Reviewed  Review and summarize past medical records: yes  Discuss the patient with other providers: yes    Risk of Complications, Morbidity, and/or Mortality  Presenting problems: low  Diagnostic procedures: low  Management options: low    Patient Progress  Patient progress: stable        Final diagnoses:   Exposure to STD            Lenard Lockhart PA-C  12/15/18 3668

## 2018-12-18 LAB
C TRACH RRNA SPEC DONR QL NAA+PROBE: POSITIVE
N GONORRHOEA DNA SPEC QL NAA+PROBE: NEGATIVE

## 2018-12-30 DIAGNOSIS — R56.9 SEIZURE (HCC): ICD-10-CM

## 2018-12-31 RX ORDER — LEVETIRACETAM 500 MG/1
TABLET ORAL
Qty: 60 TABLET | Refills: 1 | Status: SHIPPED | OUTPATIENT
Start: 2018-12-31 | End: 2019-05-22

## 2019-05-22 ENCOUNTER — OFFICE VISIT (OUTPATIENT)
Dept: INTERNAL MEDICINE | Facility: CLINIC | Age: 24
End: 2019-05-22

## 2019-05-22 VITALS
DIASTOLIC BLOOD PRESSURE: 72 MMHG | HEART RATE: 76 BPM | TEMPERATURE: 98.4 F | WEIGHT: 134 LBS | OXYGEN SATURATION: 99 % | BODY MASS INDEX: 22.3 KG/M2 | SYSTOLIC BLOOD PRESSURE: 110 MMHG

## 2019-05-22 DIAGNOSIS — J06.9 ACUTE URI: Primary | ICD-10-CM

## 2019-05-22 LAB
EXPIRATION DATE: NORMAL
INTERNAL CONTROL: NORMAL
Lab: NORMAL
S PYO RRNA THROAT QL PROBE: NEGATIVE

## 2019-05-22 PROCEDURE — 99213 OFFICE O/P EST LOW 20 MIN: CPT | Performed by: PHYSICIAN ASSISTANT

## 2019-05-22 PROCEDURE — 87651 STREP A DNA AMP PROBE: CPT | Performed by: PHYSICIAN ASSISTANT

## 2019-05-22 RX ORDER — BROMPHENIRAMINE MALEATE, PSEUDOEPHEDRINE HYDROCHLORIDE, AND DEXTROMETHORPHAN HYDROBROMIDE 2; 30; 10 MG/5ML; MG/5ML; MG/5ML
10 SYRUP ORAL 4 TIMES DAILY PRN
Qty: 200 ML | Refills: 0 | Status: SHIPPED | OUTPATIENT
Start: 2019-05-22 | End: 2019-09-05

## 2019-05-22 RX ORDER — AZITHROMYCIN 250 MG/1
TABLET, FILM COATED ORAL
Qty: 6 TABLET | Refills: 0 | Status: SHIPPED | OUTPATIENT
Start: 2019-05-22 | End: 2019-09-05

## 2019-05-22 NOTE — PROGRESS NOTES
Subjective     Chief Complaint   Patient presents with   • Sore Throat       History of Present Illness     Martin Marie Jr. is a 24 y.o. male presenting with complaints of fatigue, chills, body aches, headache, sinus pressure, postnasal drip, sore throat, congestion, cough worsening over the past week or so.  Minimal relief with OTC medications.  Does state he has a nephew that is currently ill.  He denies any fevers, chills, chest pain, palpitations, shortness of breath or wheezing, any GI symptoms.    The following portions of the patient's history were reviewed and updated as appropriate: current medications, allergies, PMH.    Review of Systems   Constitutional: Positive for chills and fatigue. Negative for appetite change, fever and unexpected weight change.   HENT: Positive for congestion, postnasal drip, sinus pressure and sore throat. Negative for ear pain, hearing loss, nosebleeds, tinnitus and trouble swallowing.    Eyes: Negative for photophobia, discharge and visual disturbance.   Respiratory: Positive for cough. Negative for chest tightness, shortness of breath and wheezing.    Cardiovascular: Negative for chest pain, palpitations and leg swelling.   Gastrointestinal: Negative for abdominal distention, abdominal pain, blood in stool, constipation, diarrhea, nausea and vomiting.   Endocrine: Negative for cold intolerance, heat intolerance, polydipsia, polyphagia and polyuria.   Genitourinary: Negative for difficulty urinating, dysuria, flank pain, frequency, hematuria and urgency.   Musculoskeletal: Negative.  Negative for arthralgias, back pain, joint swelling, myalgias, neck pain and neck stiffness.   Skin: Negative for color change, pallor, rash and wound.   Allergic/Immunologic: Negative for environmental allergies, food allergies and immunocompromised state.   Neurological: Positive for headaches. Negative for dizziness, weakness and numbness.   Hematological: Negative for adenopathy.  Does not bruise/bleed easily.   Psychiatric/Behavioral: Negative for dysphoric mood, hallucinations, sleep disturbance and suicidal ideas. The patient is not nervous/anxious.        Objective     Vitals:    05/22/19 1142   BP: 110/72   Pulse: 76   Temp: 98.4 °F (36.9 °C)   TempSrc: Temporal   SpO2: 99%   Weight: 60.8 kg (134 lb)       Physical Exam   Constitutional: Appears well-developed and well-nourished.   Ears: TMs dull bilaterally  Nose: maxillary sinuses ttp  Mouth/Throat: OP erythema, pnd noted, no exudates  Eyes: EOM are normal. Pupils are equal, round, and reactive to light.   Neck: Normal range of motion. Neck supple.   Cardiovascular: Normal rate, regular rhythm and normal heart sounds.    Pulmonary/Chest: Effort normal and breath sounds normal.   Abdominal: Soft. Bowel sounds are normal. There is no CVA tenderness.   Musculoskeletal: Normal range of motion.   Lymphadenopathy: No cervical adenopathy noted.   Neurological: Alert and oriented to person, place, and time.   Skin: Skin is warm and dry.   Psychiatric: Exhibits a normal mood and affect.     Assessment/Plan     Diagnoses and all orders for this visit:    Acute URI  -     POCT Strep A, molecular  -     brompheniramine-pseudoephedrine-DM 30-2-10 MG/5ML syrup; Take 10 mL by mouth 4 (Four) Times a Day As Needed for Congestion, Cough or Allergies.  -     azithromycin (ZITHROMAX) 250 MG tablet; Take 2 tablets the first day, then 1 tablet daily for the remaining 4 days.    Increase water intake, tylenol prn, rest. Will only begin antibiotic if not improving into the weekend.    Malu Gray PA-C  05/22/2019         Please note that portions of this note were completed with a voice recognition program. Efforts were made to edit dictation, but occasionally words are mistranscribed.

## 2019-08-09 DIAGNOSIS — J30.2 ACUTE SEASONAL ALLERGIC RHINITIS: ICD-10-CM

## 2019-08-09 RX ORDER — MONTELUKAST SODIUM 10 MG/1
TABLET ORAL
Qty: 30 TABLET | Refills: 1 | OUTPATIENT
Start: 2019-08-09

## 2019-09-05 ENCOUNTER — OFFICE VISIT (OUTPATIENT)
Dept: INTERNAL MEDICINE | Facility: CLINIC | Age: 24
End: 2019-09-05

## 2019-09-05 ENCOUNTER — TELEPHONE (OUTPATIENT)
Dept: INTERNAL MEDICINE | Facility: CLINIC | Age: 24
End: 2019-09-05

## 2019-09-05 VITALS
OXYGEN SATURATION: 98 % | TEMPERATURE: 97.9 F | WEIGHT: 146 LBS | HEIGHT: 65 IN | SYSTOLIC BLOOD PRESSURE: 110 MMHG | DIASTOLIC BLOOD PRESSURE: 76 MMHG | BODY MASS INDEX: 24.32 KG/M2 | HEART RATE: 54 BPM

## 2019-09-05 DIAGNOSIS — J30.2 SEASONAL ALLERGIC RHINITIS, UNSPECIFIED TRIGGER: ICD-10-CM

## 2019-09-05 DIAGNOSIS — J40 BRONCHITIS: Primary | ICD-10-CM

## 2019-09-05 DIAGNOSIS — R06.2 WHEEZING: ICD-10-CM

## 2019-09-05 DIAGNOSIS — Z72.0 TOBACCO ABUSE: ICD-10-CM

## 2019-09-05 PROCEDURE — 99214 OFFICE O/P EST MOD 30 MIN: CPT | Performed by: NURSE PRACTITIONER

## 2019-09-05 RX ORDER — DOXYCYCLINE 100 MG/1
100 TABLET ORAL 2 TIMES DAILY
Qty: 20 TABLET | Refills: 0 | Status: SHIPPED | OUTPATIENT
Start: 2019-09-05 | End: 2019-09-15

## 2019-09-05 RX ORDER — ALBUTEROL SULFATE 90 UG/1
2 AEROSOL, METERED RESPIRATORY (INHALATION) EVERY 4 HOURS PRN
Qty: 1 INHALER | Refills: 4 | Status: SHIPPED | OUTPATIENT
Start: 2019-09-05 | End: 2021-01-29

## 2019-09-05 RX ORDER — FEXOFENADINE HYDROCHLORIDE 60 MG/1
60 TABLET, FILM COATED ORAL DAILY
Qty: 30 TABLET | Refills: 1 | Status: SHIPPED | OUTPATIENT
Start: 2019-09-05 | End: 2019-11-09 | Stop reason: SDUPTHER

## 2019-09-05 NOTE — PROGRESS NOTES
Chief Complaint / Reason:      Chief Complaint   Patient presents with   • Cough     x 4 days       Subjective     HPI  Patient presents today with complaints of cough that has been going on for at least 4 days he states symptoms started last week and he does have seasonal allergies and started taking Allegra which provided minimal relief.  He also states that he has been taking Mucinex and other over-the-counter medications.  He states symptoms seem to be getting worse and it started initially as sinus pressure and now it is more down in his chest and he has been having a lot of wheezing and coughing a lot.  Denies fever or chills that he is aware of but states that he has not been taking his temperature.  He states the cough is interfering with his activities of daily living and sleep.  Denies chest pain or heart palpitations but does report shortness of breath with exertion and states the cough is productive and sometimes it yellow thick blood-tinged.  No history of PEs.  Patient is a current everyday smoker.  History taken from: patient    PMH/FH/Social History were reviewed and updated appropriately in the electronic medical record.   Past Medical History:   Diagnosis Date   • Acid reflux    • Bradycardia    • Depression    • Kidney stones    • Seizure disorder (CMS/HCC)    • Seizures (CMS/HCC)      Past Surgical History:   Procedure Laterality Date   • FRACTURE SURGERY      arm   • HERNIA REPAIR     • TONSILLECTOMY       Social History     Socioeconomic History   • Marital status: Single     Spouse name: Not on file   • Number of children: Not on file   • Years of education: Not on file   • Highest education level: Not on file   Tobacco Use   • Smoking status: Current Every Day Smoker     Packs/day: 0.10     Years: 1.00     Pack years: 0.10   • Smokeless tobacco: Never Used   Substance and Sexual Activity   • Alcohol use: Yes     Comment: occasional   • Drug use: Yes     Types: Marijuana     Comment: GHB   •  Sexual activity: Defer     Family History   Problem Relation Age of Onset   • Diabetes Other    • Migraines Mother    • Migraines Father    • Arthritis Maternal Grandmother    • Mental illness Other        Review of Systems:   Review of Systems   Constitutional: Positive for fatigue. Negative for chills and fever.   HENT: Positive for congestion and sore throat.    Respiratory: Positive for cough, chest tightness, shortness of breath (with exertion ) and wheezing.    Cardiovascular: Negative for chest pain.   Gastrointestinal: Negative for nausea.   Allergic/Immunologic: Positive for environmental allergies.   Psychiatric/Behavioral: Positive for sleep disturbance and stress.         All other systems were reviewed and are negative.  Exceptions are noted in the subjective or above.      Objective     Vital Signs  Vitals:    09/05/19 0949   BP: 110/76   Pulse: 54   Temp: 97.9 °F (36.6 °C)   SpO2: 98%       Body mass index is 24.3 kg/m².    Physical Exam   Constitutional: He appears well-developed and well-nourished. No distress.   HENT:   Head: Normocephalic and atraumatic.   Right Ear: Tympanic membrane is erythematous and bulging.   Left Ear: Tympanic membrane is erythematous and bulging.   Nose: Nose normal.   Mouth/Throat: Mucous membranes are dry. Posterior oropharyngeal erythema present.   Neck: Neck supple. No JVD present.   Cardiovascular: Regular rhythm and normal heart sounds. Bradycardia present.   No murmur heard.  Pulmonary/Chest: Effort normal. No stridor. No respiratory distress. He has wheezes.   Musculoskeletal: He exhibits no edema.   Lymphadenopathy:     He has cervical adenopathy.   Skin: He is not diaphoretic.   Nursing note and vitals reviewed.           Results Review:    I reviewed the patient's previous clinical results.       Medication Review:     Current Outpatient Medications:   •  albuterol sulfate  (90 Base) MCG/ACT inhaler, Inhale 2 puffs Every 4 (Four) Hours As Needed for  Wheezing or Shortness of Air., Disp: 1 inhaler, Rfl: 4  •  doxycycline (ADOXA) 100 MG tablet, Take 1 tablet by mouth 2 (Two) Times a Day for 10 days., Disp: 20 tablet, Rfl: 0  •  fexofenadine (ALLEGRA ALLERGY) 60 MG tablet, Take 1 tablet by mouth Daily., Disp: 30 tablet, Rfl: 1    Assessment/Plan   Martin was seen today for cough.    Diagnoses and all orders for this visit:    Bronchitis  -     doxycycline (ADOXA) 100 MG tablet; Take 1 tablet by mouth 2 (Two) Times a Day for 10 days.  -     albuterol sulfate  (90 Base) MCG/ACT inhaler; Inhale 2 puffs Every 4 (Four) Hours As Needed for Wheezing or Shortness of Air.  recommend coughing and deep breathing.  Increase water intake maintain hydration to help loosen secretions.  Seasonal allergic rhinitis, unspecified trigger  -     fexofenadine (ALLEGRA ALLERGY) 60 MG tablet; Take 1 tablet by mouth Daily.    Tobacco abuse   Recommend smoking cessation  wheezing  -     albuterol sulfate  (90 Base) MCG/ACT inhaler; Inhale 2 puffs Every 4 (Four) Hours As Needed for Wheezing or Shortness of Air.        Return if symptoms worsen or fail to improve.    Patsy Seth, APRN  09/05/2019

## 2019-09-05 NOTE — TELEPHONE ENCOUNTER
Patient called stating that the pharmacy did not have his antibiotic, states that they did not approve it. Can you check on this?

## 2019-10-23 ENCOUNTER — TRANSCRIBE ORDERS (OUTPATIENT)
Dept: GENERAL RADIOLOGY | Facility: HOSPITAL | Age: 24
End: 2019-10-23

## 2019-10-23 ENCOUNTER — HOSPITAL ENCOUNTER (OUTPATIENT)
Dept: GENERAL RADIOLOGY | Facility: HOSPITAL | Age: 24
Discharge: HOME | End: 2019-10-23
Admitting: NURSE PRACTITIONER

## 2019-10-23 DIAGNOSIS — R09.89 CHEST CONGESTION: ICD-10-CM

## 2019-10-23 DIAGNOSIS — R05.9 COUGH: Primary | ICD-10-CM

## 2019-10-23 PROCEDURE — 71046 X-RAY EXAM CHEST 2 VIEWS: CPT

## 2019-11-09 DIAGNOSIS — J30.2 SEASONAL ALLERGIC RHINITIS, UNSPECIFIED TRIGGER: ICD-10-CM

## 2019-11-11 RX ORDER — FEXOFENADINE HYDROCHLORIDE 60 MG/1
TABLET, FILM COATED ORAL
Qty: 30 TABLET | Refills: 1 | Status: SHIPPED | OUTPATIENT
Start: 2019-11-11 | End: 2020-01-17

## 2020-01-17 DIAGNOSIS — J30.2 SEASONAL ALLERGIC RHINITIS, UNSPECIFIED TRIGGER: ICD-10-CM

## 2020-01-17 RX ORDER — FEXOFENADINE HYDROCHLORIDE 60 MG/1
TABLET, FILM COATED ORAL
Qty: 30 TABLET | Refills: 1 | Status: SHIPPED | OUTPATIENT
Start: 2020-01-17 | End: 2020-04-13

## 2020-04-12 DIAGNOSIS — J30.2 SEASONAL ALLERGIC RHINITIS, UNSPECIFIED TRIGGER: ICD-10-CM

## 2020-04-13 RX ORDER — FEXOFENADINE HYDROCHLORIDE 60 MG/1
TABLET, FILM COATED ORAL
Qty: 30 TABLET | Refills: 5 | Status: SHIPPED | OUTPATIENT
Start: 2020-04-13

## 2021-11-23 ENCOUNTER — HOSPITAL ENCOUNTER (EMERGENCY)
Facility: HOSPITAL | Age: 26
Discharge: HOME | End: 2021-11-23
Attending: EMERGENCY MEDICINE | Admitting: EMERGENCY MEDICINE

## 2021-11-23 VITALS
SYSTOLIC BLOOD PRESSURE: 123 MMHG | BODY MASS INDEX: 21.59 KG/M2 | RESPIRATION RATE: 17 BRPM | HEIGHT: 65 IN | DIASTOLIC BLOOD PRESSURE: 84 MMHG | OXYGEN SATURATION: 99 % | WEIGHT: 129.6 LBS | HEART RATE: 86 BPM | TEMPERATURE: 97.7 F

## 2021-11-23 DIAGNOSIS — R11.2 NON-INTRACTABLE VOMITING WITH NAUSEA, UNSPECIFIED VOMITING TYPE: Primary | ICD-10-CM

## 2021-11-23 LAB
ALBUMIN SERPL-MCNC: 5.3 G/DL (ref 3.5–5.2)
ALBUMIN/GLOB SERPL: 1.7 G/DL
ALP SERPL-CCNC: 74 U/L (ref 39–117)
ALT SERPL W P-5'-P-CCNC: 36 U/L (ref 1–41)
ANION GAP SERPL CALCULATED.3IONS-SCNC: 16.1 MMOL/L (ref 5–15)
AST SERPL-CCNC: 31 U/L (ref 1–40)
BILIRUB SERPL-MCNC: 0.7 MG/DL (ref 0–1.2)
BUN SERPL-MCNC: 10 MG/DL (ref 6–20)
BUN/CREAT SERPL: 12 (ref 7–25)
CALCIUM SPEC-SCNC: 9.7 MG/DL (ref 8.6–10.5)
CHLORIDE SERPL-SCNC: 101 MMOL/L (ref 98–107)
CO2 SERPL-SCNC: 22.9 MMOL/L (ref 22–29)
CREAT SERPL-MCNC: 0.83 MG/DL (ref 0.76–1.27)
GFR SERPL CREATININE-BSD FRML MDRD: 137 ML/MIN/1.73
GLOBULIN UR ELPH-MCNC: 3.1 GM/DL
GLUCOSE SERPL-MCNC: 98 MG/DL (ref 65–99)
POTASSIUM SERPL-SCNC: 4.1 MMOL/L (ref 3.5–5.2)
PROT SERPL-MCNC: 8.4 G/DL (ref 6–8.5)
SODIUM SERPL-SCNC: 140 MMOL/L (ref 136–145)

## 2021-11-23 PROCEDURE — 25010000002 ONDANSETRON PER 1 MG: Performed by: PHYSICIAN ASSISTANT

## 2021-11-23 PROCEDURE — 80053 COMPREHEN METABOLIC PANEL: CPT | Performed by: PHYSICIAN ASSISTANT

## 2021-11-23 PROCEDURE — 99283 EMERGENCY DEPT VISIT LOW MDM: CPT

## 2021-11-23 PROCEDURE — 96361 HYDRATE IV INFUSION ADD-ON: CPT

## 2021-11-23 PROCEDURE — 96374 THER/PROPH/DIAG INJ IV PUSH: CPT

## 2021-11-23 RX ORDER — ONDANSETRON 2 MG/ML
4 INJECTION INTRAMUSCULAR; INTRAVENOUS ONCE
Status: COMPLETED | OUTPATIENT
Start: 2021-11-23 | End: 2021-11-23

## 2021-11-23 RX ADMIN — SODIUM CHLORIDE 1000 ML: 9 INJECTION, SOLUTION INTRAVENOUS at 13:44

## 2021-11-23 RX ADMIN — ONDANSETRON HYDROCHLORIDE 4 MG: 2 INJECTION, SOLUTION INTRAMUSCULAR; INTRAVENOUS at 13:45

## 2021-11-23 NOTE — ED PROVIDER NOTES
Subjective   26-year-old male presents to the ED with complaint of nausea and vomiting. He states he starting approximately 1 AM last night, that he head been drinking vodka, close to a pint.  Patient states he had not eaten anything since breakfast that morning.  Patient is still nauseous with threat of vomiting.  He denies use of stimulants, just recreational marijuana use which is a daily occurrence.  Patient states sensitive to light but denies headache.      History provided by:  Patient   used: No        Review of Systems   Constitutional: Positive for chills.   Eyes: Positive for photophobia.   Gastrointestinal: Positive for abdominal pain, nausea and vomiting.   Musculoskeletal: Positive for myalgias.   Neurological: Positive for weakness.   All other systems reviewed and are negative.      Past Medical History:   Diagnosis Date   • Acid reflux    • Bradycardia    • Depression    • Kidney stones    • Seizure disorder (HCC)    • Seizures (HCC)        Allergies   Allergen Reactions   • Penicillins Other (See Comments)     Unknown allergy       Past Surgical History:   Procedure Laterality Date   • FRACTURE SURGERY      arm   • HERNIA REPAIR     • TONSILLECTOMY         Family History   Problem Relation Age of Onset   • Diabetes Other    • Migraines Mother    • Migraines Father    • Arthritis Maternal Grandmother    • Mental illness Other        Social History     Socioeconomic History   • Marital status: Single   Tobacco Use   • Smoking status: Former Smoker     Packs/day: 0.10     Years: 1.00     Pack years: 0.10   • Smokeless tobacco: Never Used   Vaping Use   • Vaping Use: Never used   Substance and Sexual Activity   • Alcohol use: Not Currently     Comment: occasional   • Drug use: Yes     Types: Marijuana     Comment: GHB   • Sexual activity: Defer           Objective   Physical Exam  Vitals and nursing note reviewed.   Constitutional:       Appearance: Normal appearance. He is  well-developed and normal weight.   HENT:      Head: Normocephalic and atraumatic.      Nose: Nose normal.      Mouth/Throat:      Mouth: Mucous membranes are moist.   Cardiovascular:      Rate and Rhythm: Normal rate and regular rhythm.      Pulses: Normal pulses.      Heart sounds: Normal heart sounds.   Pulmonary:      Effort: Pulmonary effort is normal. No respiratory distress.      Breath sounds: Normal breath sounds. No stridor. No wheezing, rhonchi or rales.   Chest:      Chest wall: No tenderness.   Abdominal:      General: Abdomen is flat. Bowel sounds are normal.      Palpations: Abdomen is soft.   Musculoskeletal:         General: Normal range of motion.      Cervical back: Normal range of motion.   Skin:     General: Skin is warm and dry.   Neurological:      Mental Status: He is alert and oriented to person, place, and time.   Psychiatric:         Behavior: Behavior normal.         Thought Content: Thought content normal.         Judgment: Judgment normal.         Procedures           ED Course                                           MDM  Number of Diagnoses or Management Options  Non-intractable vomiting with nausea, unspecified vomiting type: new and requires workup     Amount and/or Complexity of Data Reviewed  Clinical lab tests: reviewed    Risk of Complications, Morbidity, and/or Mortality  Presenting problems: minimal  Diagnostic procedures: minimal  Management options: minimal  General comments: 26-year-old male presents with nausea vomiting from reported alcohol use, multiple episodes of vomiting since early this morning unable to keep down any solids or liquids vital signs stable exam essentially unremarkable treated with fluids and his chemistries were within normal limits patient is showing improvement and can be safely discharged with follow-up return if any worsening symptoms    Patient Progress  Patient progress: stable      Final diagnoses:   Non-intractable vomiting with nausea,  unspecified vomiting type       ED Disposition  ED Disposition     ED Disposition Condition Comment    Discharge Stable           [facility] Emergency Department  [address] [address] [phone]    If symptoms worsen         Medication List      No changes were made to your prescriptions during this visit.          Juan David Phipps Jr., PA-C  11/23/21 1442

## 2022-07-08 ENCOUNTER — APPOINTMENT (OUTPATIENT)
Dept: GENERAL RADIOLOGY | Facility: HOSPITAL | Age: 27
End: 2022-07-08

## 2022-07-08 ENCOUNTER — HOSPITAL ENCOUNTER (EMERGENCY)
Facility: HOSPITAL | Age: 27
Discharge: HOME | End: 2022-07-08
Attending: EMERGENCY MEDICINE | Admitting: EMERGENCY MEDICINE

## 2022-07-08 VITALS
BODY MASS INDEX: 21.16 KG/M2 | HEIGHT: 65 IN | WEIGHT: 127 LBS | DIASTOLIC BLOOD PRESSURE: 80 MMHG | RESPIRATION RATE: 16 BRPM | TEMPERATURE: 99.1 F | HEART RATE: 71 BPM | OXYGEN SATURATION: 98 % | SYSTOLIC BLOOD PRESSURE: 137 MMHG
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
## 2022-07-08 DIAGNOSIS — S61.552A DOG BITE OF LEFT WRIST, INITIAL ENCOUNTER: Primary | ICD-10-CM

## 2022-07-08 PROCEDURE — 73110 X-RAY EXAM OF WRIST: CPT

## 2022-07-08 PROCEDURE — 99283 EMERGENCY DEPT VISIT LOW MDM: CPT

## 2022-07-08 RX ORDER — DOXYCYCLINE 100 MG/1
100 CAPSULE ORAL ONCE
Status: COMPLETED | OUTPATIENT
Start: 2022-07-08 | End: 2022-07-08

## 2022-07-08 RX ORDER — DOXYCYCLINE 100 MG/1
100 CAPSULE ORAL 2 TIMES DAILY
Qty: 20 CAPSULE | Refills: 0 | Status: SHIPPED | OUTPATIENT
Start: 2022-07-08

## 2022-07-08 RX ORDER — IBUPROFEN 600 MG/1
600 TABLET ORAL EVERY 6 HOURS PRN
Qty: 60 TABLET | Refills: 0 | Status: SHIPPED | OUTPATIENT
Start: 2022-07-08

## 2022-07-08 RX ADMIN — DOXYCYCLINE 100 MG: 100 CAPSULE ORAL at 18:58

## 2022-07-08 NOTE — ED PROVIDER NOTES
Subjective   This is a 27-year-old male who presents to the emergency department chief complaint dog bite approximate 10 hours before arrival.  Patient states that he has a pitbull that bit his left wrist.  Patient did sustain 2 puncture wounds to his left wrist.  Patient states has had increased pain, swelling.  Does state the dog's immunization status is up-to-date for age.  Patient states that his tetanus immunization is up-to-date.      History provided by:  Patient   used: No    Animal Bite  Contact animal:  Dog  Location:  Shoulder/arm  Shoulder/arm injury location:  L wrist  Time since incident:  1 day  Incident location:  Home  Provoked: unprovoked    Animal's rabies vaccination status:  Up to date  Animal in possession: no    Tetanus status:  Unknown  Relieved by:  Nothing  Worsened by:  Nothing  Ineffective treatments:  None tried  Associated symptoms: swelling    Associated symptoms: no fever and no rash        Review of Systems   Constitutional: Negative.  Negative for fever.   Eyes: Negative.  Negative for photophobia and itching.   Respiratory: Negative.  Negative for choking, shortness of breath and stridor.    Cardiovascular: Negative.  Negative for leg swelling.   Gastrointestinal: Negative.    Genitourinary: Negative.  Negative for dysuria, enuresis, flank pain, frequency and hematuria.   Musculoskeletal: Negative for back pain, gait problem, joint swelling, myalgias and neck pain.   Skin: Positive for wound. Negative for color change, pallor and rash.   Neurological: Negative.  Negative for dizziness, light-headedness and headaches.   All other systems reviewed and are negative.      Past Medical History:   Diagnosis Date   • Acid reflux    • Bradycardia    • Depression    • Kidney stones    • Seizure disorder (HCC)    • Seizures (HCC)        Allergies   Allergen Reactions   • Penicillins Other (See Comments)     Unknown allergy       Past Surgical History:   Procedure  Laterality Date   • FRACTURE SURGERY      arm   • HERNIA REPAIR     • TONSILLECTOMY         Family History   Problem Relation Age of Onset   • Diabetes Other    • Migraines Mother    • Migraines Father    • Arthritis Maternal Grandmother    • Mental illness Other        Social History     Socioeconomic History   • Marital status: Single   Tobacco Use   • Smoking status: Former Smoker     Packs/day: 0.10     Years: 1.00     Pack years: 0.10   • Smokeless tobacco: Never Used   Vaping Use   • Vaping Use: Never used   Substance and Sexual Activity   • Alcohol use: Not Currently     Comment: occasional   • Drug use: Yes     Types: Marijuana     Comment: GHB   • Sexual activity: Defer           Objective   Physical Exam  Vitals and nursing note reviewed.   Constitutional:       General: He is not in acute distress.     Appearance: Normal appearance. He is normal weight. He is not ill-appearing or toxic-appearing.   HENT:      Head: Normocephalic and atraumatic.      Right Ear: Tympanic membrane, ear canal and external ear normal. There is no impacted cerumen.      Left Ear: Tympanic membrane, ear canal and external ear normal. There is no impacted cerumen.      Nose: Nose normal. No congestion or rhinorrhea.      Mouth/Throat:      Mouth: Mucous membranes are moist.      Pharynx: Oropharynx is clear. No oropharyngeal exudate or posterior oropharyngeal erythema.   Eyes:      General: No scleral icterus.        Right eye: No discharge.         Left eye: No discharge.      Extraocular Movements: Extraocular movements intact.      Conjunctiva/sclera: Conjunctivae normal.      Pupils: Pupils are equal, round, and reactive to light.   Cardiovascular:      Rate and Rhythm: Normal rate and regular rhythm.      Pulses: Normal pulses.      Heart sounds: Normal heart sounds. No murmur heard.    No friction rub. No gallop.   Pulmonary:      Effort: Pulmonary effort is normal. No respiratory distress.      Breath sounds: Normal breath  sounds. No stridor. No wheezing, rhonchi or rales.   Chest:      Chest wall: No tenderness.   Abdominal:      General: Abdomen is flat. Bowel sounds are normal. There is no distension.      Palpations: There is no mass.      Tenderness: There is no abdominal tenderness. There is no right CVA tenderness, left CVA tenderness, guarding or rebound.      Hernia: No hernia is present.   Musculoskeletal:         General: Swelling, tenderness and signs of injury present. No deformity. Normal range of motion.      Cervical back: Normal range of motion and neck supple. No rigidity or tenderness.      Comments: Puncture wound to dorsal aspect of left wrist. Single puncture wound located to volar aspect of left wrist. Bleeding controlled. N/V intact/    Lymphadenopathy:      Cervical: No cervical adenopathy.   Skin:     General: Skin is warm and dry.      Capillary Refill: Capillary refill takes less than 2 seconds.      Coloration: Skin is not jaundiced or pale.      Findings: No bruising, erythema or rash.   Neurological:      General: No focal deficit present.      Mental Status: He is alert and oriented to person, place, and time.      Cranial Nerves: No cranial nerve deficit.      Sensory: No sensory deficit.      Motor: No weakness.      Coordination: Coordination normal.      Gait: Gait normal.      Deep Tendon Reflexes: Reflexes normal.   Psychiatric:         Mood and Affect: Mood normal.         Thought Content: Thought content normal.         Judgment: Judgment normal.         Procedures           ED Course                                           MDM    Final diagnoses:   Dog bite of left wrist, initial encounter       ED Disposition  ED Disposition     ED Disposition   Discharge    Condition   Stable    Comment   --             Yamileth Lara PA  [address] [facility]  [address], [place] [id number] [phone]    Call in 1 day           Medication List      New Prescriptions    doxycycline 100 MG  capsule  Commonly known as: MONODOX  Take 1 capsule by mouth 2 (Two) Times a Day.     ibuprofen 600 MG tablet  Commonly known as: ADVIL,MOTRIN  Take 1 tablet by mouth Every 6 (Six) Hours As Needed for Mild Pain .           Where to Get Your Medications      These medications were sent to [facility]/pharmacy #[id number] - [place], [place] - [address] - [phone]  - [phone]   [address] [address]    Phone: [phone]   · doxycycline 100 MG capsule  · ibuprofen 600 MG tablet          Zaire Smith PA-C  07/08/22 6322

## 2023-05-21 ENCOUNTER — APPOINTMENT (OUTPATIENT)
Dept: GENERAL RADIOLOGY | Facility: HOSPITAL | Age: 28
End: 2023-05-21
Payer: COMMERCIAL

## 2023-05-21 ENCOUNTER — HOSPITAL ENCOUNTER (EMERGENCY)
Facility: HOSPITAL | Age: 28
Discharge: HOME | End: 2023-05-21
Attending: EMERGENCY MEDICINE | Admitting: EMERGENCY MEDICINE
Payer: COMMERCIAL

## 2023-05-21 VITALS
SYSTOLIC BLOOD PRESSURE: 122 MMHG | OXYGEN SATURATION: 98 % | HEIGHT: 65 IN | DIASTOLIC BLOOD PRESSURE: 68 MMHG | BODY MASS INDEX: 21.06 KG/M2 | WEIGHT: 126.4 LBS | RESPIRATION RATE: 16 BRPM | HEART RATE: 56 BPM | TEMPERATURE: 99.3 F

## 2023-05-21 DIAGNOSIS — J06.9 VIRAL UPPER RESPIRATORY TRACT INFECTION: Primary | ICD-10-CM

## 2023-05-21 PROCEDURE — 25010000002 DEXAMETHASONE PER 1 MG: Performed by: EMERGENCY MEDICINE

## 2023-05-21 PROCEDURE — 71045 X-RAY EXAM CHEST 1 VIEW: CPT

## 2023-05-21 PROCEDURE — 99283 EMERGENCY DEPT VISIT LOW MDM: CPT

## 2023-05-21 RX ORDER — MONTELUKAST SODIUM 10 MG/1
10 TABLET ORAL NIGHTLY
Qty: 30 TABLET | Refills: 0 | Status: SHIPPED | OUTPATIENT
Start: 2023-05-21

## 2023-05-21 RX ORDER — ALBUTEROL SULFATE 90 UG/1
2 AEROSOL, METERED RESPIRATORY (INHALATION) ONCE
Status: COMPLETED | OUTPATIENT
Start: 2023-05-21 | End: 2023-05-21

## 2023-05-21 RX ORDER — MONTELUKAST SODIUM 10 MG/1
10 TABLET ORAL ONCE
Status: COMPLETED | OUTPATIENT
Start: 2023-05-21 | End: 2023-05-21

## 2023-05-21 RX ADMIN — MONTELUKAST 10 MG: 10 TABLET, FILM COATED ORAL at 09:58

## 2023-05-21 RX ADMIN — DEXAMETHASONE SODIUM PHOSPHATE 10 MG: 10 INJECTION INTRAMUSCULAR; INTRAVENOUS at 09:17

## 2023-05-21 RX ADMIN — ALBUTEROL SULFATE 2 PUFF: 90 AEROSOL, METERED RESPIRATORY (INHALATION) at 09:17

## 2023-05-21 NOTE — ED PROVIDER NOTES
HPI: Martin Marie Jr. is a 27 y.o. male who presents to the emergency department complaining of several complaints.  Patient states that about a week and a half or so ago he was diagnosed with COVID-19.  He was treated with a course of doxycycline, had felt back to normal and then 3 days ago started again with cough, congestion, nasal drainage.  He denies any fevers, vomiting or diarrhea.  No significant chest pain.  He does smoke occasionally.      REVIEW OF SYSTEMS: All other systems reviewed and are negative     PAST MEDICAL HISTORY:   Past Medical History:   Diagnosis Date   • Acid reflux    • Bradycardia    • Depression    • Kidney stones    • Seizure disorder    • Seizures         FAMILY HISTORY:   Family History   Problem Relation Age of Onset   • Diabetes Other    • Migraines Mother    • Migraines Father    • Arthritis Maternal Grandmother    • Mental illness Other         SOCIAL HISTORY:   Social History     Socioeconomic History   • Marital status: Single   Tobacco Use   • Smoking status: Former     Packs/day: 0.10     Years: 1.00     Pack years: 0.10     Types: Cigarettes   • Smokeless tobacco: Never   Vaping Use   • Vaping Use: Never used   Substance and Sexual Activity   • Alcohol use: Not Currently     Comment: occasional   • Drug use: Yes     Types: Marijuana     Comment: GHB   • Sexual activity: Defer        SURGICAL HISTORY:   Past Surgical History:   Procedure Laterality Date   • FRACTURE SURGERY      arm   • HERNIA REPAIR     • TONSILLECTOMY          ALLERGIES: Penicillins       PHYSICAL EXAM:   VITAL SIGNS:   Vitals:    05/21/23 0836   BP: 121/62   Pulse: 75   Resp: 16   Temp: 99.3 °F (37.4 °C)   SpO2: 97%      CONSTITUTIONAL: Awake, well appearing, nontoxic   HENT: Atraumatic, normocephalic, oral mucosa moist, airway patent.  Nasal congestion and rhinorrhea. External ears normal.   EYES: Conjunctivae clear, EOMI, PERRL   NECK: Trachea midline, nontender, supple   CARDIOVASCULAR:  Normal heart rate, Normal rhythm.  PULMONARY/CHEST: Normal work of breathing. Clear to auscultation, no rhonchi, wheezes, or rales.  ABDOMINAL: Nondistended, soft, nontender, no rebound or guarding.  NEUROLOGIC: Nonfocal, moves all four extremities, no gross sensory or motor deficits.   EXTREMITIES: No clubbing, cyanosis, or edema   SKIN: Warm, Dry, No erythema, No rash       ED COURSE / MEDICAL DECISION MAKING:     Martin Marie Jr. is a 27 y.o. male who presents to the emergency department for evaluation of multiple complaints.  Well-developed, well-nourished young male in no distress with exam as above.  His vital signs are normal.  His exam is notable for some nasal congestion.  His oxygen saturation is normal on room air at 97%.  His lungs are clear without signs or symptoms of respiratory distress.  I suspect he has a another upper respiratory infection.  Will check x-ray to make sure he has not developed a secondary pneumonia.  Will give symptomatic treatment.  Disposition pending though anticipate discharge home.    Differential diagnosis includes viral illness, URI, pneumonia, allergies among other etiologies.    Chest x-ray per radiology is negative.  Patient is resting comfortably.  I do feel he is safe for discharge home at this time.  Recommended continued supportive measures and outpatient follow-up.    Final diagnoses:   Viral upper respiratory tract infection        Wilfredo Maciel MD  05/21/23 1013

## 2024-05-17 ENCOUNTER — HOSPITAL ENCOUNTER (EMERGENCY)
Facility: HOSPITAL | Age: 29
Discharge: HOME | End: 2024-05-17
Attending: STUDENT IN AN ORGANIZED HEALTH CARE EDUCATION/TRAINING PROGRAM
Payer: COMMERCIAL

## 2024-05-17 ENCOUNTER — APPOINTMENT (OUTPATIENT)
Dept: GENERAL RADIOLOGY | Facility: HOSPITAL | Age: 29
End: 2024-05-17
Payer: COMMERCIAL

## 2024-05-17 VITALS
SYSTOLIC BLOOD PRESSURE: 111 MMHG | RESPIRATION RATE: 18 BRPM | WEIGHT: 130 LBS | HEIGHT: 65 IN | OXYGEN SATURATION: 99 % | TEMPERATURE: 98.4 F | DIASTOLIC BLOOD PRESSURE: 67 MMHG | HEART RATE: 80 BPM | BODY MASS INDEX: 21.66 KG/M2

## 2024-05-17 DIAGNOSIS — S61.412A LACERATION OF LEFT HAND WITHOUT FOREIGN BODY, INITIAL ENCOUNTER: Primary | ICD-10-CM

## 2024-05-17 PROCEDURE — 73130 X-RAY EXAM OF HAND: CPT

## 2024-05-17 PROCEDURE — 90715 TDAP VACCINE 7 YRS/> IM: CPT

## 2024-05-17 PROCEDURE — 90471 IMMUNIZATION ADMIN: CPT

## 2024-05-17 PROCEDURE — 99283 EMERGENCY DEPT VISIT LOW MDM: CPT

## 2024-05-17 PROCEDURE — 63710000001 ONDANSETRON ODT 4 MG TABLET DISPERSIBLE: Performed by: STUDENT IN AN ORGANIZED HEALTH CARE EDUCATION/TRAINING PROGRAM

## 2024-05-17 PROCEDURE — 25010000002 TETANUS-DIPHTH-ACELL PERTUSSIS 5-2.5-18.5 LF-MCG/0.5 SUSPENSION PREFILLED SYRINGE

## 2024-05-17 RX ORDER — SULFAMETHOXAZOLE AND TRIMETHOPRIM 800; 160 MG/1; MG/1
1 TABLET ORAL 2 TIMES DAILY
Qty: 14 TABLET | Refills: 0 | Status: SHIPPED | OUTPATIENT
Start: 2024-05-17 | End: 2024-05-24

## 2024-05-17 RX ORDER — HYDROCODONE BITARTRATE AND ACETAMINOPHEN 5; 325 MG/1; MG/1
1 TABLET ORAL ONCE
Status: COMPLETED | OUTPATIENT
Start: 2024-05-17 | End: 2024-05-17

## 2024-05-17 RX ORDER — ONDANSETRON 4 MG/1
4 TABLET, ORALLY DISINTEGRATING ORAL ONCE
Status: COMPLETED | OUTPATIENT
Start: 2024-05-17 | End: 2024-05-17

## 2024-05-17 RX ADMIN — HYDROCODONE BITARTRATE AND ACETAMINOPHEN 1 TABLET: 5; 325 TABLET ORAL at 13:13

## 2024-05-17 RX ADMIN — ONDANSETRON 4 MG: 4 TABLET, ORALLY DISINTEGRATING ORAL at 13:13

## 2024-05-17 RX ADMIN — TETANUS TOXOID, REDUCED DIPHTHERIA TOXOID AND ACELLULAR PERTUSSIS VACCINE, ADSORBED 0.5 ML: 5; 2.5; 8; 8; 2.5 SUSPENSION INTRAMUSCULAR at 13:15

## 2024-05-17 NOTE — DISCHARGE INSTRUCTIONS
Take your Bactrim antibiotic as prescribed.  Return to the ER for any signs of wound infection such as swelling redness around the wound, drainage of pus from the wound, or if you develop fever or chills.    Your sutures will need to be removed in 10 to 14 days.  You can return to the ER for this or follow-up with your primary care provider.    Follow-up with orthopedic hand specialist if you develop any numbness or tingling, loss of function of your hand or fingers, or for any other concern.

## 2024-05-17 NOTE — ED PROVIDER NOTES
EMERGENCY DEPARTMENT ENCOUNTER    Pt Name: Martin Marie Jr.  MRN: [id number]  Pt :   1995  Room Number:  22SF/22  Date of encounter:  2024  PCP: Yamileth Lara PA  ED Provider: Palomo Duron PA-C    Historian: Patient, father at bedside, nursing notes      HPI:  Chief Complaint: Left hand laceration        Context: Martin Marie Jr. is a 28 y.o. male who presents to the ED c/o left hand laceration which occurred approximately 1 hour prior to arrival.  Patient states he excellently cut the palm of his left hand on a table saw.  He denies any other injury.  He denies loss of function of his fingers, numbness or tingling, or any other complaint.  Patient believes he had a tetanus shot 2 years ago but he is unsure.  Patient confirms penicillin allergy.      PAST MEDICAL HISTORY  Past Medical History:   Diagnosis Date    Acid reflux     Bradycardia     Depression     Kidney stones     Seizure disorder     Seizures          PAST SURGICAL HISTORY  Past Surgical History:   Procedure Laterality Date    FRACTURE SURGERY      arm    HERNIA REPAIR      TONSILLECTOMY           FAMILY HISTORY  Family History   Problem Relation Age of Onset    Diabetes Other     Migraines Mother     Migraines Father     Arthritis Maternal Grandmother     Mental illness Other          SOCIAL HISTORY  Social History     Socioeconomic History    Marital status: Single   Tobacco Use    Smoking status: Former     Current packs/day: 0.10     Average packs/day: 0.1 packs/day for 1 year (0.1 ttl pk-yrs)     Types: Cigarettes    Smokeless tobacco: Never   Vaping Use    Vaping status: Never Used   Substance and Sexual Activity    Alcohol use: Not Currently     Comment: occasional    Drug use: Yes     Types: Marijuana     Comment: GHB    Sexual activity: Defer         ALLERGIES  Penicillins        REVIEW OF SYSTEMS  Review of Systems   Constitutional:  Negative for chills and fever.   HENT:  Negative for congestion and  sore throat.    Respiratory:  Negative for cough and shortness of breath.    Cardiovascular:  Negative for chest pain.   Gastrointestinal:  Negative for abdominal pain, nausea and vomiting.   Genitourinary:  Negative for dysuria.   Musculoskeletal:  Negative for back pain.   Skin:  Negative for wound.   Neurological:  Negative for dizziness and headaches.   Psychiatric/Behavioral:  Negative for confusion.    All other systems reviewed and are negative.         All systems reviewed and negative except for those discussed in HPI.       PHYSICAL EXAM    I have reviewed the triage vital signs and nursing notes.    ED Triage Vitals [05/17/24 1242]   Temp Heart Rate Resp BP SpO2   98.4 °F (36.9 °C) 98 18 106/63 99 %      Temp src Heart Rate Source Patient Position BP Location FiO2 (%)   Oral Monitor Sitting Left arm --       Physical Exam  Vitals and nursing note reviewed.   Constitutional:       General: He is not in acute distress.     Appearance: Normal appearance. He is not ill-appearing, toxic-appearing or diaphoretic.   HENT:      Head: Normocephalic and atraumatic.      Right Ear: External ear normal.      Left Ear: External ear normal.      Nose: No congestion or rhinorrhea.      Mouth/Throat:      Mouth: Mucous membranes are moist.      Pharynx: Oropharynx is clear.   Eyes:      Conjunctiva/sclera: Conjunctivae normal.   Cardiovascular:      Rate and Rhythm: Normal rate.      Heart sounds: Normal heart sounds.   Pulmonary:      Effort: Pulmonary effort is normal. No respiratory distress.      Breath sounds: Normal breath sounds. No wheezing.   Abdominal:      Tenderness: There is no abdominal tenderness.   Musculoskeletal:      Cervical back: Normal range of motion and neck supple.      Right lower leg: No edema.      Left lower leg: No edema.   Skin:     Findings: No rash.             Comments: Left hand neurovascularly intact with good cap refill of all 5 fingers radial pulse 2+ palpable.   Neurological:       Mental Status: He is alert.             LAB RESULTS  No results found for this or any previous visit (from the past 24 hour(s)).    If labs were ordered, I independently reviewed the results and considered them in treating the patient.        RADIOLOGY  XR Hand 3+ View Left    Result Date: 5/17/2024  PROCEDURE: XR HAND 3+ VW LEFT-  THREE VIEW  HISTORY: injury, pain  FINDINGS:  Three views show no evidence of an acute, displaced fracture or dislocation of the visualized bony architecture.  The joint spaces appear normal. Old ulnar styloid process fracture is present. There is no radiopaque foreign body.      Unremarkable exam.     This report was signed and finalized on 5/17/2024 1:17 PM by Urbano Beck MD.       I ordered and independently reviewed the above noted radiographic studies.      I viewed images of left hand x-ray which showed no acute bony abnormalities and no radiopaque foreign bodies per my independent interpretation.    See radiologist's dictation for official interpretation.        PROCEDURES    Laceration Repair    Date/Time: 5/17/2024 1:50 PM    Performed by: Palomo Duron PA-C  Authorized by: Donavan Cordova MD    Consent:     Consent obtained:  Verbal    Consent given by:  Patient    Risks, benefits, and alternatives were discussed: yes      Risks discussed:  Infection, pain, poor cosmetic result and poor wound healing    Alternatives discussed:  No treatment  Universal protocol:     Procedure explained and questions answered to patient or proxy's satisfaction: yes      Imaging studies available: yes      Immediately prior to procedure, a time out was called: yes      Patient identity confirmed:  Arm band  Laceration details:     Location:  Hand    Hand location:  L palm    Length (cm):  4.6    Depth (mm):  5  Pre-procedure details:     Preparation:  Patient was prepped and draped in usual sterile fashion and imaging obtained to evaluate for foreign bodies  Exploration:     Hemostasis  achieved with:  Direct pressure    Imaging obtained: x-ray      Imaging outcome: foreign body not noted      Wound exploration: wound explored through full range of motion      Contaminated: yes    Treatment:     Area cleansed with:  Chlorhexidine    Amount of cleaning:  Standard    Irrigation solution:  Sterile saline    Irrigation volume:  200 ml    Irrigation method:  Pressure wash    Visualized foreign bodies/material removed: no      Debridement:  Minimal  Skin repair:     Repair method:  Sutures    Suture size:  5-0    Suture material:  Nylon    Suture technique:  Simple interrupted    Number of sutures:  8  Approximation:     Approximation:  Close  Repair type:     Repair type:  Intermediate  Post-procedure details:     Dressing:  Non-adherent dressing    Procedure completion:  Tolerated well, no immediate complications      No orders to display       MEDICATIONS GIVEN IN ER    Medications   HYDROcodone-acetaminophen (NORCO) 5-325 MG per tablet 1 tablet (1 tablet Oral Given 5/17/24 1313)   ondansetron ODT (ZOFRAN-ODT) disintegrating tablet 4 mg (4 mg Oral Given 5/17/24 1313)   Tetanus-Diphth-Acell Pertussis (BOOSTRIX) injection 0.5 mL (0.5 mL Intramuscular Given 5/17/24 1315)         MEDICAL DECISION MAKING, PROGRESS, and CONSULTS    All labs, if obtained, have been independently reviewed by me.  All radiology studies, if obtained, have been reviewed by me and the radiologist dictating the report.  All EKG's, if obtained, have been independently viewed and interpreted by me/my attending physician.      Discussion below represents my analysis of pertinent findings related to patient's condition, differential diagnosis, treatment plan and final disposition.    28-year-old male presenting for evaluation of a left hand laceration to the palm.  On exam patient's vital signs as interpreted by me are stable so rate is 98, his SpO2 is 99% on room air and his blood pressure is normal and he is afebrile.  He is upright  alert oriented communicating normally.  He has full active and passive range of motion of his left hand the left radial pulses 2+ and he has good capillary refill of all 5 fingers of the left hand.  Laceration repair procedure was performed by myself after extensive cleaning and irrigation.  8 simple interrupted sutures were placed.  Patient's tetanus was updated in the ED.  Norco was given for analgesia prior to performing the procedure.  X-ray was ordered of the left hand and per my own independent interpretation there were no acute bony abnormalities no foreign bodies.  Radiologist over read concurred.  Patient was prescribed Bactrim at discharge to prevent wound infection as he is penicillin allergic and was not a candidate for Keflex.    I provided contact information for an orthopedic hand specialist in next planed to the patient if symptoms of numbness or loss of function of his fingers present he should follow-up with them soon as possible.  Strict aftercare instructions for his wound were provided with an explanation to have sutures removed in 10 to 14 days.  Patient verbalized understanding of and agreement with this plan                     Differential diagnosis:    Differential diagnosis included but was not limited to laceration, ligamentous injury, tendon injury, foreign body, among others      Additional sources:    - Discussed/ obtained information from independent historians: Father at bedside in addition to patient    - External (non-ED) record review: None    - Chronic or social conditions impacting care: None    Orders placed during this visit:  Orders Placed This Encounter   Procedures    Laceration Repair    XR Hand 3+ View Left         Additional orders considered but not ordered: None      ED Course:    Consultants: None                Shared Decision Making:  After my consideration of clinical presentation and any laboratory/radiology studies obtained, I discussed the findings with the  patient/patient representative who is in agreement with the treatment plan and the final disposition.   Risks and benefits of discharge and/or observation/admission were discussed.       AS OF 13:54 EDT VITALS:    BP - 106/63  HR - 98  TEMP - 98.4 °F (36.9 °C) (Oral)  O2 SATS - 99%                  DIAGNOSIS  Final diagnoses:   Laceration of left hand without foreign body, initial encounter         DISPOSITION  Discharge home      Please note that portions of this document were completed with voice recognition software.      Palomo Duron PA-C  05/17/24 3998

## 2024-05-18 NOTE — ED RE-EVALUATION NOTE
[facility] pharmacy called about patient's Bactrim prescription.  Patient up apparently allergic to Bactrim but is not noted on our MAR.  Patient Bactrim was then changed to Keflex 500 mg p.o. twice daily for 5 days.  Patient was notified about change and possible cross-reactivity with penicillins.     Francisco Ayala, APRN  05/18/24 0908